# Patient Record
(demographics unavailable — no encounter records)

---

## 2025-04-22 NOTE — HISTORY OF PRESENT ILLNESS
[FreeTextEntry1] : He is a 58-year-old asymptomatic male referred for evaluation of elevated liver function tests.  He denies abdominal pain or jaundice.  Denies alcohol use.  He is not on any medications. on 4/15/25 his AST was 74.  and . Hep B & C were neg as well as Hep A IgM. Smooth muscle and anti mitiochondrial antibody were normal, SHERLY was positive   GGTP was elevated indicating that his ALP was form his liver.

## 2025-04-22 NOTE — ASSESSMENT
[FreeTextEntry1] : This may represent autoimmune hepatitis versus fatty liver, elevated cholesterol she had an elevated cholesterol last year  I drew labs  I also ordered an ultrasound  Office follow up in 2 weeks.  I reviewed all his labs from April 15 including CBC hepatic profile hepatitis B hepatitis C SHERLY antimitochondrial antibody anti-smooth muscle antibody and GGTP

## 2025-04-22 NOTE — CONSULT LETTER
[Dear  ___] : Dear  [unfilled], [Consult Letter:] : I had the pleasure of evaluating your patient, [unfilled]. [( Thank you for referring [unfilled] for consultation for _____ )] : Thank you for referring [unfilled] for consultation for [unfilled] [Please see my note below.] : Please see my note below. [Consult Closing:] : Thank you very much for allowing me to participate in the care of this patient.  If you have any questions, please do not hesitate to contact me. [Sincerely,] : Sincerely, [FreeTextEntry3] : Lang Barksdale MD  Gastroenterology [facility]